# Patient Record
Sex: MALE | Race: BLACK OR AFRICAN AMERICAN | Employment: FULL TIME | URBAN - METROPOLITAN AREA
[De-identification: names, ages, dates, MRNs, and addresses within clinical notes are randomized per-mention and may not be internally consistent; named-entity substitution may affect disease eponyms.]

---

## 2021-11-22 ENCOUNTER — APPOINTMENT (OUTPATIENT)
Dept: MRI IMAGING | Facility: HOSPITAL | Age: 61
End: 2021-11-22
Attending: EMERGENCY MEDICINE
Payer: COMMERCIAL

## 2021-11-22 ENCOUNTER — APPOINTMENT (OUTPATIENT)
Dept: CT IMAGING | Facility: HOSPITAL | Age: 61
End: 2021-11-22
Attending: EMERGENCY MEDICINE
Payer: COMMERCIAL

## 2021-11-22 ENCOUNTER — HOSPITAL ENCOUNTER (OUTPATIENT)
Facility: HOSPITAL | Age: 61
Setting detail: OBSERVATION
Discharge: HOME | End: 2021-11-24
Attending: EMERGENCY MEDICINE | Admitting: HOSPITALIST
Payer: COMMERCIAL

## 2021-11-22 DIAGNOSIS — R42 VERTIGO: Primary | ICD-10-CM

## 2021-11-22 PROCEDURE — 70551 MRI BRAIN STEM W/O DYE: CPT | Performed by: EMERGENCY MEDICINE

## 2021-11-22 PROCEDURE — 99219 INITIAL OBSERVATION CARE,LEVL II: CPT | Performed by: HOSPITALIST

## 2021-11-22 PROCEDURE — 70450 CT HEAD/BRAIN W/O DYE: CPT | Performed by: EMERGENCY MEDICINE

## 2021-11-22 RX ORDER — PROCHLORPERAZINE EDISYLATE 5 MG/ML
5 INJECTION INTRAMUSCULAR; INTRAVENOUS EVERY 8 HOURS PRN
Status: DISCONTINUED | OUTPATIENT
Start: 2021-11-22 | End: 2021-11-24

## 2021-11-22 RX ORDER — METHYLPREDNISOLONE SODIUM SUCCINATE 40 MG/ML
40 INJECTION, POWDER, LYOPHILIZED, FOR SOLUTION INTRAMUSCULAR; INTRAVENOUS ONCE
Status: COMPLETED | OUTPATIENT
Start: 2021-11-22 | End: 2021-11-22

## 2021-11-22 RX ORDER — ONDANSETRON 2 MG/ML
4 INJECTION INTRAMUSCULAR; INTRAVENOUS ONCE
Status: COMPLETED | OUTPATIENT
Start: 2021-11-22 | End: 2021-11-22

## 2021-11-22 RX ORDER — ACETAMINOPHEN 325 MG/1
650 TABLET ORAL EVERY 6 HOURS PRN
Status: DISCONTINUED | OUTPATIENT
Start: 2021-11-22 | End: 2021-11-24

## 2021-11-22 RX ORDER — MECLIZINE HYDROCHLORIDE 25 MG/1
25 TABLET ORAL ONCE
Status: COMPLETED | OUTPATIENT
Start: 2021-11-22 | End: 2021-11-22

## 2021-11-22 RX ORDER — FAMOTIDINE 10 MG/ML
20 INJECTION, SOLUTION INTRAVENOUS ONCE
Status: COMPLETED | OUTPATIENT
Start: 2021-11-22 | End: 2021-11-22

## 2021-11-22 RX ORDER — PREDNISONE 20 MG/1
20 TABLET ORAL
Status: DISCONTINUED | OUTPATIENT
Start: 2021-11-23 | End: 2021-11-24

## 2021-11-22 RX ORDER — DEXTROSE MONOHYDRATE 25 G/50ML
50 INJECTION, SOLUTION INTRAVENOUS
Status: DISCONTINUED | OUTPATIENT
Start: 2021-11-22 | End: 2021-11-24

## 2021-11-22 RX ORDER — DIAZEPAM 5 MG/ML
5 INJECTION, SOLUTION INTRAMUSCULAR; INTRAVENOUS ONCE
Status: COMPLETED | OUTPATIENT
Start: 2021-11-22 | End: 2021-11-22

## 2021-11-22 RX ORDER — MECLIZINE HCL 12.5 MG/1
25 TABLET ORAL EVERY 6 HOURS SCHEDULED
Status: DISCONTINUED | OUTPATIENT
Start: 2021-11-22 | End: 2021-11-24

## 2021-11-22 RX ORDER — ONDANSETRON 2 MG/ML
4 INJECTION INTRAMUSCULAR; INTRAVENOUS EVERY 6 HOURS PRN
Status: DISCONTINUED | OUTPATIENT
Start: 2021-11-22 | End: 2021-11-24

## 2021-11-22 NOTE — ED QUICK NOTES
Orders for admission, patient is aware of plan and ready to go upstairs. Any questions, please call ED RN Janak Dolan  at extension [phone].    Type of COVID test sent: Rapid  COVID Suspicion level: Low    LOC at time of transport: a&o 4/4    Other pertinent informa

## 2021-11-22 NOTE — ED PROVIDER NOTES
Patient Seen in: [facility] AND [facility] Emergency Department      History   Patient presents with:  Nausea/Vomiting/Diarrhea    Stated Complaint: n/v    Subjective:   HPI     56-year-old male who denies medical history was trying to work this morning as a pos is no tenderness. There is no guarding. Musculoskeletal: Normal range of motion. No edema or tenderness. Neurological: Alert and oriented to person, place, and time. No gross focal deficits  Skin: Skin is warm and dry.    Psychiatric: Normal mood and a Automated exposure control for dose reduction was used. Dose information is transmitted to the [facility] ([address]) which includes the Dose Index Registry.   FINDINGS:  CSF SPACES: Ventricles, cisterns demonstrate no significant mucosal thickening or fluid. ORBITS: Limited views are unremarkable. OTHER: Negative. CONCLUSION:  1. Normal noncontrast ( 3 sequence) MR brain. 2. No restricted diffusion or acute infarction.     Dictated by (CST): Mor

## 2021-11-22 NOTE — H&P
[facility]    PATIENT'S NAME: Artkeiry Washington   ATTENDING PHYSICIAN: Gissel Oneil MD   PATIENT ACCOUNT#:   [id number]    LOCATION:  [place] [address]  MEDICAL RECORD #:   [id number]       YOB: 1960  ADMISSION DATE:       11/22 time, place, and person. Moderate distress. VITAL SIGNS:  Temperature 96.9, pulse 52, respiratory rate 21, blood pressure 141/74, pulse ox 98% on room air. HEENT:  Atraumatic. Oropharynx clear. Dry mucous membranes. Normal hard and soft palate.   Ey

## 2021-11-23 PROCEDURE — 99226 SUBSEQUENT OBSERVATION CARE: CPT | Performed by: HOSPITALIST

## 2021-11-23 NOTE — PLAN OF CARE
Patient resting in bed. Alert & orientedx4. Patient reports dizziness is worse when opening eyes or moving to sit at the edge of the bed. Continuing meclizine as ordered. Fall precautions in place. Call light in reach.    Problem: Patient [address] cardiac arrhythmias or at baseline  Description: INTERVENTIONS:  - Continuous cardiac monitoring, monitor vital signs, obtain 12 lead EKG if indicated  - Evaluate effectiveness of antiarrhythmic and heart rate control medications as ordered  - Initiate jo

## 2021-11-23 NOTE — PHYSICAL THERAPY NOTE
PHYSICAL THERAPY EVALUATION - INPATIENT     Room Number: 320/320-A  Evaluation Date: 11/23/2021  Type of Evaluation: Initial   Physician Order: See Comment for Specific Order (vestibular therapy)    Presenting Problem: vertigo, N/V     Reason for Therapy: Hypofunction. Pt educated on vestibular pathology and tx, pt with good understanding. Pt educated on seated R head turns for habituation exercises, seated VOR x 20 sec, pt able to demo both correctly post verbal cues.  Handout provided with address and phon pertinent past medical history. Past Surgical History  History reviewed. No pertinent surgical history.     HOME SITUATION  Home Situation  Type of Home: Condo  Home Layout: One level  Stairs to Enter : 0  Stairs to Bedroom: 0  Lives With: Parent(s)  Dri the patient currently have. ..   Patient Difficulty: Turning over in bed (including adjusting bedclothes, sheets and blankets)?: None   Patient Difficulty: Sitting down on and standing up from a chair with arms (e.g., wheelchair, bedside commode, etc.): JACKELINE Samuels Patient is able to demonstrate transfers Sit to/from Stand at assistance level: supervision with walker - rolling     Goal #2  Current Status    Goal #3 Patient is able to ambulate 200 feet with assist device: walker - rolling at assistance level: supervis

## 2021-11-24 VITALS
SYSTOLIC BLOOD PRESSURE: 125 MMHG | HEIGHT: 75 IN | RESPIRATION RATE: 18 BRPM | TEMPERATURE: 98 F | BODY MASS INDEX: 20.29 KG/M2 | HEART RATE: 57 BPM | OXYGEN SATURATION: 96 % | DIASTOLIC BLOOD PRESSURE: 78 MMHG | WEIGHT: 163.19 LBS

## 2021-11-24 PROCEDURE — 99217 OBSERVATION CARE DISCHARGE: CPT | Performed by: HOSPITALIST

## 2021-11-24 RX ORDER — ACETAMINOPHEN 325 MG/1
650 TABLET ORAL EVERY 6 HOURS PRN
Qty: 1 TABLET | Refills: 0 | Status: SHIPPED | COMMUNITY
Start: 2021-11-24

## 2021-11-24 RX ORDER — MECLIZINE HYDROCHLORIDE 25 MG/1
25 TABLET ORAL EVERY 6 HOURS SCHEDULED
Qty: 30 TABLET | Refills: 0 | Status: SHIPPED | OUTPATIENT
Start: 2021-11-24

## 2021-11-24 RX ORDER — PREDNISONE 20 MG/1
20 TABLET ORAL
Qty: 2 TABLET | Refills: 0 | Status: SHIPPED | OUTPATIENT
Start: 2021-11-25 | End: 2021-11-27

## 2021-11-24 NOTE — DISCHARGE SUMMARY
Dc summary#[id number]  > 30 min spent on [address] Discharge Diagnoses: peripheral vertigo    Lace+ Score: 31  59-90 High Risk  29-58 Medium Risk  0-28   Low Risk.     TCM Follow-Up Recommendation:  LACE < 29: Low Risk of readmission after discharge from

## 2021-11-24 NOTE — DISCHARGE PLANNING
Patient was provided with discharge instructions, education, and follow up information. Printed prescriptions were called in to patient's pharmacy [facility] in [facility] #[phone].  Patient verbalizes understanding of follow up information, specifically

## 2021-11-24 NOTE — PLAN OF CARE
A&Ox4, SB due to minimal dizziness, From home alone. On room air. Possible discharge today with vestibular outpatient therapy.    Problem: Patient Centered Care  Goal: Patient preferences are identified and integrated in the patient's plan of care  Descript monitor vital signs, obtain 12 lead EKG if indicated  - Evaluate effectiveness of antiarrhythmic and heart rate control medications as ordered  - Initiate emergency measures for life threatening arrhythmias  - Monitor electrolytes and administer replacemen

## 2021-11-24 NOTE — PHYSICAL THERAPY NOTE
PHYSICAL THERAPY TREATMENT NOTE - INPATIENT     Room Number: 320/320-A       Presenting Problem: vertigo, N/V       Problem List  Principal Problem:    Vertigo      PHYSICAL THERAPY ASSESSMENT     Chart reviewed, RN notified of therapy tx.  ALLISON brown Inpatient Basic Mobility Short Form. Research supports that patients with this level of impairment may benefit from home.  Pt has progressed mobility to safe level for d/c home with family assist. Recommend initial 24 hr supervision 1-2 days until able to Supervision  Distance (ft): 400' x 1  Assistive Device: Rolling walker  Pattern: Shuffle (slow to moderate speed, mild flexed posture)  Stairs:  (N/A)  How Many Stairs: 0  Device: 1 Rail  Assist:  (/)  Pattern:  (/)  Ascend and Descend :  Other (comment) (/

## 2021-11-24 NOTE — PROGRESS NOTES
[facility]    Progress Note    Fabiola Nath Patient Status:  Observation    1960 MRN [id number]   Location [facility] 3W/SW Attending Esperanza Matias MD   Hosp Day # 0 PCP No primary care provider on file.        Subjective: 11/22/2021 at 11:17 AM     Finalized by (CST): Virginia Vazquez MD on 11/22/2021 at 11:24 AM          MRI BRAIN WO ACUTE (3) SEQUENCE (CPT=70551)    Result Date: 11/22/2021  CONCLUSION:  1. Normal noncontrast ( 3 sequence) MR brain.  2. No restricted diffusion

## 2021-11-24 NOTE — PLAN OF CARE
Pt resting in bed. A&Ox4. No complaints of nausea and minor dizziness when walking back to bed. Plan for DC home today and outpatient vestibular therapy. Call light within reach.     Problem: Patient Centered Care  Goal: Patient preferences are identified

## 2021-11-26 NOTE — PAYOR COMM NOTE
Discharge Notification    Patient Name: Chris Carlos: [address] #: [id number]  Authorization Number: N/A  Admit Date/Time: 11/22/2021 10:08 AM  Discharge Date/Time: 11/24/2021 1:35 PM

## 2021-11-29 NOTE — DISCHARGE SUMMARY
[facility]    PATIENT'S NAME: BEATRIS Rich   ATTENDING PHYSICIAN: Pepe Plata MD   PATIENT ACCOUNT#:   [id number]    LOCATION:  [address] [place] [address] RECORD #:   [id number]       YOB: 1960  ADMISSION DATE:       11/22/2 to stay off work from the [place] [employer] until he is better. DISCHARGE MEDICATIONS:    1. Tylenol 650 mg q.6 h. as needed for pain or fever. 2.   Meclizine 25 mg q.6 h. as needed for dizziness.   3.   Prednisone 20 mg daily for 2 days with breakfast on

## 2021-11-30 ENCOUNTER — OFFICE VISIT (OUTPATIENT)
Dept: FAMILY MEDICINE CLINIC | Facility: CLINIC | Age: 61
End: 2021-11-30
Payer: COMMERCIAL

## 2021-11-30 ENCOUNTER — TELEPHONE (OUTPATIENT)
Dept: INTERNAL MEDICINE CLINIC | Facility: CLINIC | Age: 61
End: 2021-11-30

## 2021-11-30 VITALS
TEMPERATURE: 97 F | DIASTOLIC BLOOD PRESSURE: 72 MMHG | WEIGHT: 163 LBS | HEART RATE: 82 BPM | SYSTOLIC BLOOD PRESSURE: 118 MMHG | HEIGHT: 75 IN | BODY MASS INDEX: 20.27 KG/M2

## 2021-11-30 DIAGNOSIS — Z76.89 ENCOUNTER TO ESTABLISH CARE: Primary | ICD-10-CM

## 2021-11-30 DIAGNOSIS — R42 VERTIGO: ICD-10-CM

## 2021-11-30 DIAGNOSIS — Z09 HOSPITAL DISCHARGE FOLLOW-UP: ICD-10-CM

## 2021-11-30 PROCEDURE — 3074F SYST BP LT 130 MM HG: CPT | Performed by: FAMILY MEDICINE

## 2021-11-30 PROCEDURE — 99203 OFFICE O/P NEW LOW 30 MIN: CPT | Performed by: FAMILY MEDICINE

## 2021-11-30 PROCEDURE — 3078F DIAST BP <80 MM HG: CPT | Performed by: FAMILY MEDICINE

## 2021-11-30 PROCEDURE — 3008F BODY MASS INDEX DOCD: CPT | Performed by: FAMILY MEDICINE

## 2021-11-30 NOTE — PROGRESS NOTES
HPI:    Christine King is a 63year old male presents to clinic as a new patient to establish care. Patient was recently admitted at [facility] with symptoms of vertigo.  Patient was at work, started to experience dizziness, nausea, vomit effort is normal. No respiratory distress. Breath sounds: Normal breath sounds. No stridor. No wheezing or rhonchi. Neurological:      General: No focal deficit present. Mental Status: He is alert.    Psychiatric:         Mood and Affect: Mood n

## 2021-11-30 NOTE — TELEPHONE ENCOUNTER
Patient dropped off FMLA forms for completion, left after visit with doctor, was told to stop at  and fill the [address] and pay fee,but did not. Scanned to the [employer] dept and placed in the forms box.

## 2022-01-17 ENCOUNTER — PATIENT OUTREACH (OUTPATIENT)
Dept: CASE MANAGEMENT | Age: 62
End: 2022-01-17

## 2022-05-16 ENCOUNTER — OFFICE VISIT (OUTPATIENT)
Dept: FAMILY MEDICINE CLINIC | Facility: CLINIC | Age: 62
End: 2022-05-16
Payer: COMMERCIAL

## 2022-05-16 ENCOUNTER — LAB ENCOUNTER (OUTPATIENT)
Dept: LAB | Age: 62
End: 2022-05-16
Attending: FAMILY MEDICINE
Payer: COMMERCIAL

## 2022-05-16 VITALS
HEART RATE: 59 BPM | DIASTOLIC BLOOD PRESSURE: 84 MMHG | WEIGHT: 170 LBS | OXYGEN SATURATION: 99 % | RESPIRATION RATE: 19 BRPM | HEIGHT: 75 IN | SYSTOLIC BLOOD PRESSURE: 137 MMHG | BODY MASS INDEX: 21.14 KG/M2

## 2022-05-16 DIAGNOSIS — Z12.11 SCREENING FOR COLON CANCER: ICD-10-CM

## 2022-05-16 DIAGNOSIS — Z00.00 ANNUAL PHYSICAL EXAM: Primary | ICD-10-CM

## 2022-05-16 PROCEDURE — 3008F BODY MASS INDEX DOCD: CPT | Performed by: FAMILY MEDICINE

## 2022-05-16 PROCEDURE — 3079F DIAST BP 80-89 MM HG: CPT | Performed by: FAMILY MEDICINE

## 2022-05-16 PROCEDURE — 3075F SYST BP GE 130 - 139MM HG: CPT | Performed by: FAMILY MEDICINE

## 2022-05-16 PROCEDURE — 99396 PREV VISIT EST AGE 40-64: CPT | Performed by: FAMILY MEDICINE

## 2022-05-17 LAB
BUN: 19 MG/DL (ref 7–25)
CALCIUM: 9.4 MG/DL (ref 8.6–10.3)
CARBON DIOXIDE: 29 MMOL/L (ref 20–32)
CHLORIDE: 103 MMOL/L (ref 98–110)
CHOL/HDLC RATIO: 2.2 (CALC)
CHOLESTEROL, TOTAL: 176 MG/DL
CREATININE: 0.99 MG/DL (ref 0.7–1.25)
EGFR IF AFRICN AM: 96 ML/MIN/1.73M2
EGFR IF NONAFRICN AM: 82 ML/MIN/1.73M2
GLUCOSE: 93 MG/DL (ref 65–99)
HDL CHOLESTEROL: 81 MG/DL
LDL-CHOLESTEROL: 82 MG/DL (CALC)
NON-HDL CHOLESTEROL: 95 MG/DL (CALC)
POTASSIUM: 4.7 MMOL/L (ref 3.5–5.3)
PSA, TOTAL: 1.29 NG/ML
SODIUM: 139 MMOL/L (ref 135–146)
TRIGLYCERIDES: 45 MG/DL

## 2023-03-17 ENCOUNTER — TELEPHONE (OUTPATIENT)
Dept: FAMILY MEDICINE CLINIC | Facility: CLINIC | Age: 63
End: 2023-03-17

## 2023-03-17 NOTE — TELEPHONE ENCOUNTER
Called and left message for patient to follow up on cologuard form that was faxed from 05/2022. Left message to see if patient completed the cologuard.

## (undated) NOTE — LETTER
11/30/2021          To Whom It May Concern:    Eunice Hubbard is currently under my medical care and was seen in clinic today. Please excuse his absence until 12/12/2021. He is cleared to return to work without restrictions on 12/13/2021.    If you require